# Patient Record
Sex: FEMALE | Race: WHITE | NOT HISPANIC OR LATINO | URBAN - METROPOLITAN AREA
[De-identification: names, ages, dates, MRNs, and addresses within clinical notes are randomized per-mention and may not be internally consistent; named-entity substitution may affect disease eponyms.]

---
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
## 2019-03-06 ENCOUNTER — OFFICE VISIT - RIVER FALLS (OUTPATIENT)
Dept: FAMILY MEDICINE | Facility: CLINIC | Age: 19
End: 2019-03-06

## 2019-03-06 LAB
ALBUMIN UR-MCNC: ABNORMAL G/DL
BACTERIA #/AREA URNS HPF: NORMAL /[HPF]
BILIRUB UR QL STRIP: NEGATIVE
EPITHELIAL CELLS UR: NORMAL
GLUCOSE UR STRIP-MCNC: NEGATIVE MG/DL
HGB UR QL STRIP: NEGATIVE
KETONES UR STRIP-MCNC: NEGATIVE MG/DL
LEUKOCYTE ESTERASE UR QL STRIP: NEGATIVE
MUCOUS THREADS #/AREA URNS LPF: PRESENT /[LPF]
NITRATE UR QL: NEGATIVE
PH UR STRIP: 6 [PH] (ref 5–8)
RBC #/AREA URNS AUTO: NORMAL /[HPF]
SP GR UR STRIP: ABNORMAL (ref 1–1.03)
WBC #/AREA URNS AUTO: NORMAL /[HPF]

## 2019-03-06 ASSESSMENT — MIFFLIN-ST. JEOR: SCORE: 1301.11

## 2019-03-08 LAB — BACTERIA SPEC CULT: NORMAL

## 2022-02-12 VITALS
BODY MASS INDEX: 23.85 KG/M2 | HEART RATE: 88 BPM | HEIGHT: 62 IN | SYSTOLIC BLOOD PRESSURE: 98 MMHG | TEMPERATURE: 98.6 F | WEIGHT: 129.6 LBS | DIASTOLIC BLOOD PRESSURE: 70 MMHG

## 2022-02-12 VITALS
HEART RATE: 112 BPM | TEMPERATURE: 99.3 F | SYSTOLIC BLOOD PRESSURE: 106 MMHG | DIASTOLIC BLOOD PRESSURE: 66 MMHG | WEIGHT: 132 LBS

## 2022-02-15 NOTE — NURSING NOTE
Comprehensive Intake Entered On:  3/6/2019 10:35 AM CST    Performed On:  3/6/2019 10:23 AM CST by Jayla Srivastava               Summary   Chief Complaint :   c/o poss UTI- urinary frequency since yesterday. States she started amoxicillin last week prescribed by dentist.   Weight Measured :   129.6 lb(Converted to: 129 lb 10 oz, 58.79 kg)    Height Measured :   62 in(Converted to: 5 ft 2 in, 157.48 cm)    Body Mass Index :   23.7 kg/m2   Body Surface Area :   1.6 m2   Systolic Blood Pressure :   98 mmHg   Diastolic Blood Pressure :   70 mmHg   Mean Arterial Pressure :   79 mmHg   Peripheral Pulse Rate :   88 bpm   BP Site :   Right arm   Pulse Site :   Radial artery   BP Method :   Manual   HR Method :   Manual   Temperature Tympanic :   98.6 DegF(Converted to: 37.0 DegC)    Jayla Srivastava - 3/6/2019 10:23 AM CST   Health Status   Allergies Verified? :   Yes   Medication History Verified? :   Yes   Medical History Verified? :   Yes   Pre-Visit Planning Status :   Completed   Tobacco Use? :   Never smoker   Jayla Srivastava - 3/6/2019 10:23 AM CST   Consents   Consent for Immunization Exchange :   Consent Granted   Consent for Immunizations to Providers :   Consent Granted   Jayla Srivastava - 3/6/2019 10:23 AM CST   Meds / Allergies   (As Of: 3/6/2019 10:35:28 AM CST)   Allergies (Active)   Squash  Estimated Onset Date:   Unspecified ; Created By:   Lynn Figueroa; Reaction Status:   Active ; Category:   Drug ; Substance:   Squash ; Type:   Allergy ; Updated By:   Lynn Figueroa; Reviewed Date:   3/6/2019 10:31 AM CST        Medication List   (As Of: 3/6/2019 10:35:28 AM CST)   Prescription/Discharge Order    lidocaine topical  :   lidocaine topical ; Status:   Prescribed ; Ordered As Mnemonic:   lidocaine 2% topical gel with applicator ; Simple Display Line:   See Instructions, apply to affected area tid prn pain, 30 mL, 0 Refill(s) ; Ordering Provider:   Shira Packer PA-C; Catalog  Code:   lidocaine topical ; Order Dt/Tm:   11/28/2018 4:07:51 PM          fluconazole  :   fluconazole ; Status:   Prescribed ; Ordered As Mnemonic:   Diflucan 150 mg oral tablet ; Simple Display Line:   150 mg, 1 tab(s), PO, Once, reapeat in 1 week if needed, 2 tab(s), 0 Refill(s) ; Ordering Provider:   Shira Packer PA-C; Catalog Code:   fluconazole ; Order Dt/Tm:   11/28/2018 4:06:26 PM            Home Meds    amoxicillin  :   amoxicillin ; Status:   Documented ; Ordered As Mnemonic:   amoxicillin 500 mg oral capsule ; Simple Display Line:   500 mg, 1 cap(s), Oral, qid, 0 Refill(s) ; Catalog Code:   amoxicillin ; Order Dt/Tm:   3/6/2019 10:30:49 AM          amphetamine-dextroamphetamine  :   amphetamine-dextroamphetamine ; Status:   Documented ; Ordered As Mnemonic:   Adderall XR 20 mg oral capsule, extended release ; Simple Display Line:   20 mg, 1 cap(s), PO, qAM, 90 cap(s), 0 Refill(s) ; Catalog Code:   amphetamine-dextroamphetamine ; Order Dt/Tm:   3/6/2019 10:29:48 AM          cholecalciferol  :   cholecalciferol ; Status:   Documented ; Ordered As Mnemonic:   Vitamin D3 ; Simple Display Line:   0 Refill(s) ; Catalog Code:   cholecalciferol ; Order Dt/Tm:   3/6/2019 10:30:37 AM          magnesium amino acids chelate  :   magnesium amino acids chelate ; Status:   Documented ; Ordered As Mnemonic:   magnesium amino acids chelate ; Simple Display Line:   Oral, 0 Refill(s) ; Catalog Code:   magnesium amino acids chelate ; Order Dt/Tm:   3/6/2019 10:30:18 AM          melatonin  :   melatonin ; Status:   Documented ; Ordered As Mnemonic:   Melatonin ; Simple Display Line:   hs, 0 Refill(s) ; Catalog Code:   melatonin ; Order Dt/Tm:   3/6/2019 10:30:29 AM          sertraline  :   sertraline ; Status:   Documented ; Ordered As Mnemonic:   Zoloft 100 mg oral tablet ; Simple Display Line:   200 mg, 2 tab(s), PO, Daily, 90 tab(s), 0 Refill(s) ; Catalog Code:   sertraline ; Order Dt/Tm:   3/6/2019 10:29:40 AM           amphetamine-dextroamphetamine  :   amphetamine-dextroamphetamine ; Status:   Completed ; Ordered As Mnemonic:   Adderall ; Simple Display Line:   Oral, bid, 0 Refill(s) ; Catalog Code:   amphetamine-dextroamphetamine ; Order Dt/Tm:   11/28/2018 3:37:35 PM          diphenhydrAMINE  :   diphenhydrAMINE ; Status:   Completed ; Ordered As Mnemonic:   diphenhydrAMINE 50 mg oral capsule ; Simple Display Line:   50 mg, 1 cap(s), Oral, once, 0 Refill(s) ; Catalog Code:   diphenhydrAMINE ; Order Dt/Tm:   11/28/2018 3:38:09 PM          drospirenone-ethinyl estradiol  :   drospirenone-ethinyl estradiol ; Status:   Documented ; Ordered As Mnemonic:   Genna 3 mg-0.02 mg oral tablet ; Simple Display Line:   1 tab(s), Oral, daily, 0 Refill(s) ; Catalog Code:   drospirenone-ethinyl estradiol ; Order Dt/Tm:   11/28/2018 3:37:54 PM          fluconazole  :   fluconazole ; Status:   Completed ; Ordered As Mnemonic:   Diflucan 150 mg oral tablet ; Simple Display Line:   150 mg, 1 tab(s), PO, Once, 1 tab(s), 0 Refill(s) ; Catalog Code:   fluconazole ; Order Dt/Tm:   11/28/2018 3:38:25 PM          sertraline  :   sertraline ; Status:   Completed ; Ordered As Mnemonic:   sertraline 100 mg oral tablet ; Simple Display Line:   100 mg, 1 tab(s), Oral, daily, 0 Refill(s) ; Catalog Code:   sertraline ; Order Dt/Tm:   11/28/2018 3:37:45 PM

## 2022-02-15 NOTE — PROGRESS NOTES
Patient:   MAKENZIE JORDAN            MRN: [id number]            FIN: [id number]               Age:   18 years     Sex:  Female     :  2000   Associated Diagnoses:   Acute cystitis   Author:   Nallely Fry      Chief Complaint   3/6/2019 10:23 AM CST    c/o poss UTI- urinary frequency since yesterday. States she started amoxicillin last week prescribed by dentist.        History of Present Illness   Concerning symptoms as listed in Chief Complaint above discussed and confirmed with patient  has been on amox 1 week for dental concern  onset yesterday of urinary frequency --voided 5x in 10 min--not painful but states foul odor and dark urine  she has had rare UTI, did have one kidney infection   she struggles with constipation, many days since BM, is planning to start Miralax again.  Many foods she chooses not to eat, high fiber diet is difficult  Never sexually active  cycles oc's continuous so she does get yeast infections, states her hometown provider has prescribed Makenzie diflucan in case she needs it and Makenzie is sure this is not a yeast infection         Review of Systems   Constitutional:  No fever, No chills.    Gastrointestinal:  No nausea, No vomiting.       Health Status   Allergies:    Allergic Reactions (Selected)  Severity Not Documented  Squash (No reactions were documented)   Medications:  (Selected)   Prescriptions  Prescribed  Diflucan 150 mg oral tablet: = 1 tab(s) ( 150 mg ), PO, Once, Instructions: reapeat in 1 week if needed, # 2 tab(s), 0 Refill(s), Type: Soft Stop, Pharmacy: [facility], 1 tab(s) Oral once,Instr:reapeat in 1 week if needed  lidocaine 2% topical gel with applicator: See Instructions, Instructions: apply to affected area tid prn pain, # 30 mL, 0 Refill(s), Type: Soft Stop, Pharmacy: [facility], apply to affected area tid prn pain  Documented Medications  Documented  Adderall XR 20 mg oral capsule, extended release: =  1 cap(s) ( 20 mg ), PO, qAM, # 90 cap(s), 0 Refill(s), Type: Maintenance  Melatonin: hs, 0 Refill(s), Type: Maintenance  Genna 3 mg-0.02 mg oral tablet: 1 tab(s), Oral, daily, 0 Refill(s), Type: Maintenance  Vitamin D3: 0 Refill(s), Type: Maintenance  Zoloft 100 mg oral tablet: = 2 tab(s) ( 200 mg ), PO, Daily, # 90 tab(s), 0 Refill(s), Type: Maintenance  amoxicillin 500 mg oral capsule: = 1 cap(s) ( 500 mg ), Oral, qid, 0 Refill(s), Type: Maintenance  magnesium amino acids chelate: Oral, 0 Refill(s), Type: Maintenance   Problem list:    No problem items selected or recorded.      Histories   Past Medical History:    No active or resolved past medical history items have been selected or recorded.   Family History:    Sleep apnea  Grandparent  Diabetes  Cousin (M)  Mother     Procedure history:    No active procedure history items have been selected or recorded.   Social History:        Alcohol Assessment            Never      Tobacco Assessment            Never (less than 100 in lifetime)      Substance Abuse Assessment            Never      Employment and Education Assessment            Student      Home and Environment Assessment            Marital status: Single.      Nutrition and Health Assessment            Type of diet: Regular.      Exercise and Physical Activity Assessment            Exercise frequency: every once in awhile.      Sexual Assessment            Sexually active: No.  Identifies as female, Sexual orientation: Straight or heterosexual.  Contraceptive Use               Details: Birth control pill.      Physical Examination   Vital Signs   3/6/2019 10:23 AM CST Temperature Tympanic 98.6 DegF    Peripheral Pulse Rate 88 bpm    Pulse Site Radial artery    HR Method Manual    Systolic Blood Pressure 98 mmHg    Diastolic Blood Pressure 70 mmHg    Mean Arterial Pressure 79 mmHg    BP Site Right arm    BP Method Manual      Measurements from flowsheet : Measurements   3/6/2019 10:23 AM CST Height  Measured - Standard 62 in    Weight Measured - Standard 129.6 lb    BSA 1.6 m2    Body Mass Index 23.7 kg/m2    Body Mass Index Percentile 71.28      General:  Alert and oriented, No acute distress.    Genitourinary:  No costovertebral angle tenderness.    Integumentary:  Warm, Dry, Pink, No rash.       Review / Management   Results review:  Lab results   3/6/2019 11:09 AM CST UA Epithelial Cells Moderate    UA Mucous Present    UA WBC 3-5    UA RBC 3-5    UA Bacteria Many   3/6/2019 10:45 AM CST UA pH 6.0    UA Specific Gravity > OR = 1.030    UA Glucose NEGATIVE    UA Bilirubin NEGATIVE    UA Ketones NEGATIVE    Urine Occult Blood NEGATIVE    UA Protein 2+    UA Nitrite NEGATIVE    UA Leukocyte Esterase NEGATIVE   .       Impression and Plan   Diagnosis     Acute cystitis ([id number] N30.00).     Patient Instructions:       Counseled: Patient, Regarding diagnosis, Regarding treatment, Regarding medications, Verbalized understanding.    Orders     Orders (Selected)   Outpatient Orders  Ordered (In Transit)  Culture, Urine, Routine* ([facility]): Specimen Type: Urine (Clean Catch), Collection Date: 03/06/19 10:41:00 CST.     I am most suspicious she is constipated and stool is pressing on the bladder and causing frequency  will await UC  treat constipation, counseled on methods to treat and prevent.

## 2022-02-15 NOTE — PROGRESS NOTES
Chief Complaint    Pt c/o yeast infections. Gets one about 1 time a month.  History of Present Illness      Chief complaint as above reviewed and confirmed with patient.  Pt presents to the clinic with concerns re: vaginal itching, irritation.  has frequent yeast infections.  Last was about 3-4 months ago.  no urinary frequency, urgency or hematuria. no nausea or vomiting. no fevers or chills.  no concern about STI.          Review of Systems      Review of systems is negative with the exception of those noted in HPI          Physical Exam   Vitals & Measurements    T: 99.3   F (Tympanic)  HR: 112(Peripheral)  BP: 106/66     WT: 132 lb       External genitalia that of normal young female.      no genital lesions present but some mild erythema, linear fissures at the introitus, thick white discharge present.       pt requested wet prep done without speculum.       Wet prep: yeast.          Assessment/Plan       1. Yeast infection (B37.9)         diflucan as needed.  Pt request lidocaine 2% gel for pain control as she has had good results with that, refill given.  return for persistent or worsening sx.       2. Vaginitis (N76.0)       Orders:         fluconazole, = 1 tab(s) ( 150 mg ), PO, Once, Instructions: reapeat in 1 week if needed, # 2 tab(s), 0 Refill(s), Type: Soft Stop, Pharmacy: [facility], 1 tab(s) Oral once,Instr:reapeat in 1 week if needed, (Ordered)         lidocaine topical, See Instructions, Instructions: apply to affected area tid prn pain, # 30 mL, 0 Refill(s), Type: Soft Stop, Pharmacy: [facility], apply to affected area tid prn pain, (Ordered)         Wet Prep Vaginal (Request), Priority: Urgent, Burning with urination  Patient Information     Name:CHANTEL JORDAN      Address:      [address] [place] [address]     Sex:Female     YOB: 2000     [phone]     MRN:[id number]     FIN:[id number]     Location:[facility]     Date of Service:11/28/2018      Primary Care Physician:       NONE ,       Attending Physician:       Birdie GUTIERRES, Shira MILES, [phone]  Problem List/Past Medical History    Ongoing     No qualifying data    Historical     No qualifying data  Medications     Adderall: Oral, bid, 0 Refill(s).     sertraline 100 mg oral tablet: 100 mg, 1 tab(s), Oral, daily, 0 Refill(s).     Genna 3 mg-0.02 mg oral tablet: 1 tab(s), Oral, daily, 0 Refill(s).     diphenhydrAMINE 50 mg oral capsule: 50 mg, 1 cap(s), Oral, once, 0 Refill(s).     Diflucan 150 mg oral tablet: 150 mg, 1 tab(s), PO, Once, 1 tab(s), 0 Refill(s).     Diflucan 150 mg oral tablet: 150 mg, 1 tab(s), PO, Once, reapeat in 1 week if needed, 2 tab(s), 0 Refill(s).     lidocaine 2% topical gel with applicator: See Instructions, apply to affected area tid prn pain, 30 mL, 0 Refill(s).          Allergies    Squash  Social History    Smoking Status - 11/28/2018     Never smoker  Immunizations      Vaccine Date Status      influenza virus vaccine, inactivated 10/03/2018 Given  Lab Results       Lab Results (Last 4 results within 90 days)        Wet Prep Yeast: Present (11/28/18 16:06:00 CST)       Wet Prep Trichomonas: None Seen (11/28/18 16:06:00 CST)       Wet Prep Clue Cells: None Seen (11/28/18 16:06:00 CST)      Pt medical history and PHQ-9 reviewed.  PHQ 9= elevated.  She is on medication for anxiety and followed in her home town by her pediatrician in [place], [place].  She feels sx are well controlled.

## 2022-02-15 NOTE — LETTER
(Inserted Image. Unable to display)   March 11, 2019      CHANTEL JORDAN      [address] [place], [place] [id number]        Dear CHANTEL,    Thank you for selecting [facility] for your healthcare needs.  Below you will find the results of the recent tests done at our clinic.      The urine culture is negative for bacterial growth.      Result Name Current Result   Culture Urine  See comment 3/6/2019       Please contact me or my assistant at [phone] if you have any questions or concerns.     Sincerely,        ROSALIO Ledesma  Family Nurse Practitioner      What do your labs mean?  Below is a glossary of commonly ordered labs:  LDL   Bad Cholesterol   HDL   Good Cholesterol  AST/ALT   Liver Function   Cr/Creatinine   Kidney Function  Microalbumin   Kidney Function  BUN   Kidney Function  PSA   Prostate    TSH   Thyroid Hormone  HgbA1c   Diabetes Test   Hgb (Hemoglobin)   Red Blood Cells  WBC   White Blood Cell Count